# Patient Record
Sex: FEMALE | URBAN - METROPOLITAN AREA
[De-identification: names, ages, dates, MRNs, and addresses within clinical notes are randomized per-mention and may not be internally consistent; named-entity substitution may affect disease eponyms.]

---

## 2021-12-30 ENCOUNTER — APPOINTMENT (RX ONLY)
Dept: URBAN - METROPOLITAN AREA CLINIC 96 | Facility: CLINIC | Age: 36
Setting detail: DERMATOLOGY
End: 2021-12-30

## 2021-12-30 DIAGNOSIS — L29.89 OTHER PRURITUS: ICD-10-CM

## 2021-12-30 DIAGNOSIS — L29.8 OTHER PRURITUS: ICD-10-CM

## 2021-12-30 DIAGNOSIS — L50.1 IDIOPATHIC URTICARIA: ICD-10-CM

## 2021-12-30 DIAGNOSIS — L50.3 DERMATOGRAPHIC URTICARIA: ICD-10-CM

## 2021-12-30 PROCEDURE — 99203 OFFICE O/P NEW LOW 30 MIN: CPT | Mod: 25

## 2021-12-30 PROCEDURE — 96372 THER/PROPH/DIAG INJ SC/IM: CPT

## 2021-12-30 PROCEDURE — ? COUNSELING

## 2021-12-30 PROCEDURE — ? PRESCRIPTION

## 2021-12-30 PROCEDURE — ? INTRAMUSCULAR KENALOG

## 2021-12-30 RX ORDER — DOXEPIN HYDROCHLORIDE 25 MG/1
CAPSULE ORAL
Qty: 30 | Refills: 1 | Status: ERX | COMMUNITY
Start: 2021-12-30

## 2021-12-30 RX ADMIN — DOXEPIN HYDROCHLORIDE 1: 25 CAPSULE ORAL at 00:00

## 2021-12-30 ASSESSMENT — LOCATION ZONE DERM: LOCATION ZONE: TRUNK

## 2021-12-30 ASSESSMENT — LOCATION DETAILED DESCRIPTION DERM
LOCATION DETAILED: EPIGASTRIC SKIN
LOCATION DETAILED: INFERIOR THORACIC SPINE
LOCATION DETAILED: RIGHT BUTTOCK

## 2021-12-30 ASSESSMENT — LOCATION SIMPLE DESCRIPTION DERM
LOCATION SIMPLE: RIGHT BUTTOCK
LOCATION SIMPLE: UPPER BACK
LOCATION SIMPLE: ABDOMEN

## 2021-12-30 NOTE — PROCEDURE: INTRAMUSCULAR KENALOG
Administered By (Optional): DAVID Franklin MA CDT
Consent: The risks of atrophy were reviewed with the patient.
Detail Level: None
Concentration (Mg/Ml): 40.0
Concentration (Mg/Ml) Of Additional Medication: 2.5
Add Option For Additional Mediation: No
Kenalog Preparation: kenalog
Total Volume (Ccs): 1.5

## 2021-12-30 NOTE — HPI: RASH
What Type Of Note Output Would You Prefer (Optional)?: Standard Output
Is The Patient Presenting As Previously Scheduled?: Yes
How Severe Is Your Rash?: moderate
Is This A New Presentation, Or A Follow-Up?: Rash
Additional History: Patient started with congestion then woke up itching and had full blown hives